# Patient Record
Sex: FEMALE | Race: WHITE
[De-identification: names, ages, dates, MRNs, and addresses within clinical notes are randomized per-mention and may not be internally consistent; named-entity substitution may affect disease eponyms.]

---

## 2018-09-07 ENCOUNTER — HOSPITAL ENCOUNTER (EMERGENCY)
Dept: HOSPITAL 31 - C.ER | Age: 43
Discharge: HOME | End: 2018-09-07
Payer: COMMERCIAL

## 2018-09-07 VITALS
HEART RATE: 85 BPM | SYSTOLIC BLOOD PRESSURE: 122 MMHG | TEMPERATURE: 98.1 F | DIASTOLIC BLOOD PRESSURE: 77 MMHG | OXYGEN SATURATION: 98 % | RESPIRATION RATE: 18 BRPM

## 2018-09-07 DIAGNOSIS — Z72.0: ICD-10-CM

## 2018-09-07 DIAGNOSIS — F41.9: Primary | ICD-10-CM

## 2018-09-07 DIAGNOSIS — R00.2: ICD-10-CM

## 2018-09-07 LAB
ALBUMIN SERPL-MCNC: 4.5 G/DL (ref 3.5–5)
ALBUMIN/GLOB SERPL: 1.2 {RATIO} (ref 1–2.1)
ALT SERPL-CCNC: 25 U/L (ref 9–52)
APTT BLD: 31 SECONDS (ref 21–34)
AST SERPL-CCNC: 20 U/L (ref 14–36)
BACTERIA #/AREA URNS HPF: (no result) /[HPF]
BASOPHILS # BLD AUTO: 0 K/UL (ref 0–0.2)
BASOPHILS NFR BLD: 0.3 % (ref 0–2)
BILIRUB UR-MCNC: NEGATIVE MG/DL
BUN SERPL-MCNC: 7 MG/DL (ref 7–17)
CALCIUM SERPL-MCNC: 9.3 MG/DL (ref 8.6–10.4)
EOSINOPHIL # BLD AUTO: 0.1 K/UL (ref 0–0.7)
EOSINOPHIL NFR BLD: 0.6 % (ref 0–4)
ERYTHROCYTE [DISTWIDTH] IN BLOOD BY AUTOMATED COUNT: 14.6 % (ref 11.5–14.5)
GFR NON-AFRICAN AMERICAN: > 60
GLUCOSE UR STRIP-MCNC: NORMAL MG/DL
HGB BLD-MCNC: 13.5 G/DL (ref 11–16)
INR PPP: 1.1
LEUKOCYTE ESTERASE UR-ACNC: (no result) LEU/UL
LYMPHOCYTES # BLD AUTO: 3.2 K/UL (ref 1–4.3)
LYMPHOCYTES NFR BLD AUTO: 26.5 % (ref 20–40)
MCH RBC QN AUTO: 26.3 PG (ref 27–31)
MCHC RBC AUTO-ENTMCNC: 33 G/DL (ref 33–37)
MCV RBC AUTO: 79.6 FL (ref 81–99)
MONOCYTES # BLD: 0.6 K/UL (ref 0–0.8)
MONOCYTES NFR BLD: 4.8 % (ref 0–10)
NEUTROPHILS # BLD: 8.1 K/UL (ref 1.8–7)
NEUTROPHILS NFR BLD AUTO: 67.8 % (ref 50–75)
NRBC BLD AUTO-RTO: 0.1 % (ref 0–2)
PH UR STRIP: 6 [PH] (ref 5–8)
PLATELET # BLD: 384 K/UL (ref 130–400)
PMV BLD AUTO: 8.3 FL (ref 7.2–11.7)
PROT UR STRIP-MCNC: NEGATIVE MG/DL
PROTHROMBIN TIME: 12.3 SECONDS (ref 9.7–12.2)
RBC # BLD AUTO: 5.15 MIL/UL (ref 3.8–5.2)
RBC # UR STRIP: (no result) /UL
SP GR UR STRIP: 1.01 (ref 1–1.03)
SQUAMOUS EPITHIAL: < 1 /HPF (ref 0–5)
UROBILINOGEN UR-MCNC: NORMAL MG/DL (ref 0.2–1)
WBC # BLD AUTO: 12 K/UL (ref 4.8–10.8)

## 2018-09-07 NOTE — C.PDOC
History Of Present Illness


42 year old female presents to the emergency department with complaints of 

palpitations. Patient reports a history of anxiety, but denies fever, chills, 

chest pain, nausea, and vomiting. Patient states that she feels her heart rate 

is fast, but regular. 


Time Seen by Provider: 09/07/18 19:27


Chief Complaint (Nursing): Palpitations


History Per: Patient


History/Exam Limitations: no limitations


Onset/Duration Of Symptoms: Hrs


Current Symptoms Are (Timing): Still Present


Associated Symptoms: Other (palpitations)


Quality Of Symptoms: Rapid Heart Rate, No Rhythm Irregularity





Past Medical History


Reviewed: Historical Data, Nursing Documentation, Vital Signs


Vital Signs: 


 Last Vital Signs











Temp  98.2 F   09/07/18 18:51


 


Pulse  108 H  09/07/18 19:30


 


Resp  20   09/07/18 18:51


 


BP  168/98 H  09/07/18 18:51


 


Pulse Ox  99   09/07/18 19:48














- Medical History


PMH: Anxiety, Sleep Apnea


Surgical History: No Surg Hx


Family History: States: Unknown Family Hx





- Social History


Hx Tobacco Use: Yes


Hx Alcohol Use: No


Hx Substance Use: No





- Immunization History


Hx Tetanus Toxoid Vaccination: No


Hx Influenza Vaccination: No


Hx Pneumococcal Vaccination: No





Review Of Systems


Constitutional: Negative for: Fever, Chills


Cardiovascular: Positive for: Palpitations.  Negative for: Chest Pain


Gastrointestinal: Negative for: Nausea, Vomiting





Physical Exam





- Physical Exam


Appears: Non-toxic, No Acute Distress, Other (morbidly obese)


Skin: Warm, Dry


Head: Normacephalic


Eye(s): bilateral: Normal Inspection


Oral Mucosa: Moist


Neck: Trachea Midline, Supple


Chest: Symmetrical, No Tenderness


Cardiovascular: Rhythm Regular, No Murmur


Respiratory: No Rales, No Rhonchi, No Wheezing


Gastrointestinal/Abdominal: Soft, No Tenderness, No Guarding, No Rebound, Other 

(obese)


Neurological/Psych: Oriented x3





ED Course And Treatment





- Laboratory Results


Result Diagrams: 


 09/07/18 19:40





 09/07/18 19:40


ECG: Interpreted By Me, Viewed By Me


ECG Rhythm: Sinus Tachycardia (127), Nonspecific Changes


O2 Sat by Pulse Oximetry: 99 (RA)


Pulse Ox Interpretation: Normal


Progress Note: Plan:  EKG.  CMP.  TSH.  CBC.  PTT.  Prothrombin Time.  NaCl IV 

Fluids.  HCG Qualitative Urine.  Urinalysis


Reevaluation Time: 21:33


Reassessment Condition: Improved





Medical Decision Making


Medical Decision Making: 





Upon provider reevaluation patient is feeling better, is medically stable, and 

requires no further treatment in the ED at this time. Patient will be 

discharged home with Rx for  . Counseling was provided and all questions were 

answered regarding diagnosis and need for follow up with the referred clinic. 

There is agreement to discharge plan. Return if symptoms persist or worsen.





Disposition


Counseled Patient/Family Regarding: Studies Performed, Diagnosis, Need For 

Followup





- Disposition


Referrals: 


Ana Laura Nicholas MD [Staff Provider] - 


Disposition: HOME/ ROUTINE


Disposition Time: 19:29


Condition: FAIR


Additional Instructions: 


Please return if symptoms recur. May also need an event monitor


Instructions:  Palpitations (DC), Anxiety, Adult (DC)


Forms:  [facility] (English)





- Clinical Impression


Clinical Impression: 


 Palpitations, Anxiety








- Scribe Statement


The provider has reviewed the documentation as recorded by the Scribe (Ed Parker)


Provider Attestation: 


All medical record entries made by the Scribe were at my direction and 

personally dictated by me. I have reviewed the chart and agree that the record 

accurately reflects my personal performance of the history, physical exam, 

medical decision making, and the department course for this patient. I have 

also personally directed, reviewed, and agree with the discharge instructions 

and disposition.